# Patient Record
Sex: FEMALE | Race: WHITE | NOT HISPANIC OR LATINO | Employment: OTHER | URBAN - METROPOLITAN AREA
[De-identification: names, ages, dates, MRNs, and addresses within clinical notes are randomized per-mention and may not be internally consistent; named-entity substitution may affect disease eponyms.]

---

## 2023-06-15 ENCOUNTER — PATIENT OUTREACH (OUTPATIENT)
Dept: CARE COORDINATION | Facility: CLINIC | Age: 73
End: 2023-06-15
Payer: MEDICARE

## 2023-08-07 ENCOUNTER — OFFICE VISIT (OUTPATIENT)
Dept: PRIMARY CARE | Facility: CLINIC | Age: 73
End: 2023-08-07
Payer: MEDICARE

## 2023-08-07 VITALS
SYSTOLIC BLOOD PRESSURE: 136 MMHG | BODY MASS INDEX: 27.32 KG/M2 | WEIGHT: 164 LBS | TEMPERATURE: 98.6 F | OXYGEN SATURATION: 97 % | DIASTOLIC BLOOD PRESSURE: 84 MMHG | HEIGHT: 65 IN | HEART RATE: 74 BPM

## 2023-08-07 DIAGNOSIS — M12.9 ARTHRITIS, MULTIPLE JOINT INVOLVEMENT: ICD-10-CM

## 2023-08-07 DIAGNOSIS — R92.8 ABNORMAL MAMMOGRAM: ICD-10-CM

## 2023-08-07 DIAGNOSIS — Z78.0 ASYMPTOMATIC MENOPAUSAL STATE: ICD-10-CM

## 2023-08-07 DIAGNOSIS — H91.93 BILATERAL HEARING LOSS, UNSPECIFIED HEARING LOSS TYPE: ICD-10-CM

## 2023-08-07 DIAGNOSIS — Z85.038 HISTORY OF COLON CANCER: ICD-10-CM

## 2023-08-07 DIAGNOSIS — M85.80 OSTEOPENIA, SENILE: ICD-10-CM

## 2023-08-07 DIAGNOSIS — E04.2 NONTOXIC MULTINODULAR GOITER: ICD-10-CM

## 2023-08-07 DIAGNOSIS — H61.23 CERUMINOSIS, BILATERAL: ICD-10-CM

## 2023-08-07 DIAGNOSIS — E78.5 BORDERLINE HYPERLIPIDEMIA: ICD-10-CM

## 2023-08-07 DIAGNOSIS — L84 PRE-ULCERATIVE CORN OR CALLOUS: ICD-10-CM

## 2023-08-07 DIAGNOSIS — Z12.31 ENCOUNTER FOR SCREENING MAMMOGRAM FOR BREAST CANCER: ICD-10-CM

## 2023-08-07 DIAGNOSIS — E16.1 OTHER HYPOGLYCEMIA: ICD-10-CM

## 2023-08-07 DIAGNOSIS — Z13.6 SCREENING FOR CARDIOVASCULAR CONDITION: ICD-10-CM

## 2023-08-07 DIAGNOSIS — Z23 NEED FOR VACCINATION: ICD-10-CM

## 2023-08-07 DIAGNOSIS — Z00.00 ROUTINE GENERAL MEDICAL EXAMINATION AT HEALTH CARE FACILITY: Primary | ICD-10-CM

## 2023-08-07 DIAGNOSIS — Z13.21 ENCOUNTER FOR VITAMIN DEFICIENCY SCREENING: ICD-10-CM

## 2023-08-07 DIAGNOSIS — Z13.1 DIABETES MELLITUS SCREENING: ICD-10-CM

## 2023-08-07 DIAGNOSIS — E03.9 HYPOTHYROIDISM, UNSPECIFIED TYPE: ICD-10-CM

## 2023-08-07 DIAGNOSIS — E55.9 VITAMIN D DEFICIENCY: ICD-10-CM

## 2023-08-07 PROBLEM — K80.20 GALL STONES: Status: ACTIVE | Noted: 2023-08-07

## 2023-08-07 PROBLEM — M11.20 PSEUDOGOUT: Status: ACTIVE | Noted: 2023-08-07

## 2023-08-07 PROBLEM — Z98.890 STATUS POST EXCISIONAL BIOPSY: Status: ACTIVE | Noted: 2023-08-07

## 2023-08-07 PROBLEM — N64.89 RADIAL SCAR OF BREAST: Status: ACTIVE | Noted: 2023-08-07

## 2023-08-07 PROBLEM — M25.551 CHRONIC RIGHT HIP PAIN: Status: ACTIVE | Noted: 2023-08-07

## 2023-08-07 PROBLEM — M54.50 LUMBAGO: Status: ACTIVE | Noted: 2023-08-07

## 2023-08-07 PROBLEM — G89.29 CHRONIC RIGHT HIP PAIN: Status: ACTIVE | Noted: 2023-08-07

## 2023-08-07 PROBLEM — N95.2 VAGINAL ATROPHY: Status: ACTIVE | Noted: 2023-08-07

## 2023-08-07 PROBLEM — F43.20: Status: ACTIVE | Noted: 2023-08-07

## 2023-08-07 PROBLEM — N64.89 RADIAL SCAR OF BREAST: Status: RESOLVED | Noted: 2023-08-07 | Resolved: 2023-08-07

## 2023-08-07 PROCEDURE — G0439 PPPS, SUBSEQ VISIT: HCPCS | Performed by: INTERNAL MEDICINE

## 2023-08-07 PROCEDURE — 1125F AMNT PAIN NOTED PAIN PRSNT: CPT | Performed by: INTERNAL MEDICINE

## 2023-08-07 PROCEDURE — 1036F TOBACCO NON-USER: CPT | Performed by: INTERNAL MEDICINE

## 2023-08-07 PROCEDURE — 1159F MED LIST DOCD IN RCRD: CPT | Performed by: INTERNAL MEDICINE

## 2023-08-07 PROCEDURE — 1160F RVW MEDS BY RX/DR IN RCRD: CPT | Performed by: INTERNAL MEDICINE

## 2023-08-07 PROCEDURE — 99213 OFFICE O/P EST LOW 20 MIN: CPT | Performed by: INTERNAL MEDICINE

## 2023-08-07 PROCEDURE — 1170F FXNL STATUS ASSESSED: CPT | Performed by: INTERNAL MEDICINE

## 2023-08-07 PROCEDURE — 69210 REMOVE IMPACTED EAR WAX UNI: CPT | Performed by: INTERNAL MEDICINE

## 2023-08-07 RX ORDER — DICLOFENAC SODIUM 75 MG/1
75 TABLET, DELAYED RELEASE ORAL 2 TIMES DAILY PRN
Qty: 60 TABLET | Refills: 11 | Status: SHIPPED | OUTPATIENT
Start: 2023-08-07 | End: 2023-08-10 | Stop reason: SDUPTHER

## 2023-08-07 RX ORDER — DICLOFENAC SODIUM 75 MG/1
75 TABLET, DELAYED RELEASE ORAL 2 TIMES DAILY PRN
Qty: 60 TABLET | Refills: 5 | Status: SHIPPED
Start: 2023-08-07 | End: 2023-08-07 | Stop reason: SDUPTHER

## 2023-08-07 ASSESSMENT — LIFESTYLE VARIABLES
AUDIT-C TOTAL SCORE: 2
HOW OFTEN DO YOU HAVE A DRINK CONTAINING ALCOHOL: MONTHLY OR LESS
HOW OFTEN DO YOU HAVE SIX OR MORE DRINKS ON ONE OCCASION: LESS THAN MONTHLY
HOW MANY STANDARD DRINKS CONTAINING ALCOHOL DO YOU HAVE ON A TYPICAL DAY: 1 OR 2
SKIP TO QUESTIONS 9-10: 0

## 2023-08-07 ASSESSMENT — COLUMBIA-SUICIDE SEVERITY RATING SCALE - C-SSRS: 1. IN THE PAST MONTH, HAVE YOU WISHED YOU WERE DEAD OR WISHED YOU COULD GO TO SLEEP AND NOT WAKE UP?: NO

## 2023-08-07 ASSESSMENT — ACTIVITIES OF DAILY LIVING (ADL)
DOING_HOUSEWORK: INDEPENDENT
DRESSING: INDEPENDENT
BATHING: INDEPENDENT
MANAGING_FINANCES: INDEPENDENT
TAKING_MEDICATION: INDEPENDENT
GROCERY_SHOPPING: INDEPENDENT

## 2023-08-07 ASSESSMENT — PATIENT HEALTH QUESTIONNAIRE - PHQ9
2. FEELING DOWN, DEPRESSED OR HOPELESS: NOT AT ALL
1. LITTLE INTEREST OR PLEASURE IN DOING THINGS: NOT AT ALL
SUM OF ALL RESPONSES TO PHQ9 QUESTIONS 1 AND 2: 0
2. FEELING DOWN, DEPRESSED OR HOPELESS: NOT AT ALL
1. LITTLE INTEREST OR PLEASURE IN DOING THINGS: NOT AT ALL
SUM OF ALL RESPONSES TO PHQ9 QUESTIONS 1 AND 2: 0

## 2023-08-07 ASSESSMENT — ENCOUNTER SYMPTOMS: CONSTITUTIONAL NEGATIVE: 1

## 2023-08-07 NOTE — PROGRESS NOTES
"Subjective   Patient ID: Verito Cruz is a 73 y.o. female who presents for Medicare Annual Wellness Visit Initial.    HPI     Left  ear  blocked.   Reduced hearing.     Went on trip with walking and now  has a pain .         Review of Systems   Constitutional: Negative.    HENT:  Positive for hearing loss.    All other systems reviewed and are negative.      Objective   /84   Pulse 74   Temp 37 °C (98.6 °F)   Ht 1.651 m (5' 5\")   Wt 74.4 kg (164 lb)   SpO2 97%   BMI 27.29 kg/m²     Physical Exam  Vitals and nursing note reviewed.   Constitutional:       Appearance: Normal appearance.   HENT:      Head: Normocephalic and atraumatic.      Nose: Nose normal.   Eyes:      Conjunctiva/sclera: Conjunctivae normal.   Cardiovascular:      Rate and Rhythm: Normal rate and regular rhythm.   Pulmonary:      Effort: Pulmonary effort is normal.   Skin:     General: Skin is dry.   Neurological:      General: No focal deficit present.      Mental Status: She is alert and oriented to person, place, and time. Mental status is at baseline.   Psychiatric:         Mood and Affect: Mood normal.         Behavior: Behavior normal.         Assessment/Plan   Problem List Items Addressed This Visit          Medium    RESOLVED: Abnormal mammogram    Arthritis, multiple joint involvement    Relevant Medications    diclofenac (Voltaren) 75 mg EC tablet    Other Relevant Orders    CBC    Borderline hyperlipidemia    Relevant Orders    CBC    History of colon cancer    Nontoxic multinodular goiter    Relevant Orders    CBC    Osteopenia, senile    Relevant Orders    CBC    Vitamin D deficiency    Relevant Orders    Vitamin D 1,25 Dihydroxy     Other Visit Diagnoses       Routine general medical examination at health care facility    -  Primary    Relevant Orders    1 Year Follow Up In Primary Care - Wellness Exam    Other hypoglycemia        Relevant Orders    Hemoglobin A1C    Diabetes mellitus screening        Relevant Orders    " Hemoglobin A1C    Comprehensive Metabolic Panel    Screening for cardiovascular condition        Relevant Orders    Lipid panel    CT cardiac scoring wo IV contrast    Need for vaccination        Asymptomatic menopausal state        Relevant Orders    XR DEXA bone density    Encounter for screening mammogram for breast cancer        Relevant Orders    BI mammo bilateral screening tomosynthesis    Pre-ulcerative corn or callous        Relevant Orders    Referral to Podiatry    Encounter for vitamin deficiency screening        Relevant Orders    Vitamin D 1,25 Dihydroxy    Ceruminosis, bilateral   (Acute)      bilateroal  cerumenosis  cleaned . see prceedure not.   use  debrox  left  ear  x  5 nights.   return for  continued cleaning  7 d.    Relevant Medications    carbamide peroxide (Debrox) 6.5 % otic solution    Hypothyroidism, unspecified type        Relevant Orders    Thyroxine, Free    Bilateral hearing loss, unspecified hearing loss type [H91.93]            Patient ID: Verito Cruz is a 73 y.o. female.    Procedures    Cerumen Impaction: Patient presents with diminished hearing for the past 7 day.  There is not a prior history of cerumen impaction.  The patient was not using ear drops to loosen wax immediately prior to this visit.       Pt  jasmin  ear clearing of   cerumen impaction with several passes using  ear  curette  .  Large amounts  removed from  both ears using  several passes.   Left  ear incomplete. Had to stop  due to pain. No  bleeding,  tm puncture,  or infection .  Pt  tolerated  procedure well .      Fu  1 week for   additional  cleanign after using   h202   x  5 night.          .

## 2023-08-10 DIAGNOSIS — M12.9 ARTHRITIS, MULTIPLE JOINT INVOLVEMENT: ICD-10-CM

## 2023-08-10 RX ORDER — DICLOFENAC SODIUM 75 MG/1
75 TABLET, DELAYED RELEASE ORAL 2 TIMES DAILY PRN
Qty: 180 TABLET | Refills: 3 | Status: SHIPPED | OUTPATIENT
Start: 2023-08-10 | End: 2024-08-09

## 2023-08-14 ENCOUNTER — LAB (OUTPATIENT)
Dept: LAB | Facility: LAB | Age: 73
End: 2023-08-14
Payer: MEDICARE

## 2023-08-14 ENCOUNTER — OFFICE VISIT (OUTPATIENT)
Dept: PRIMARY CARE | Facility: CLINIC | Age: 73
End: 2023-08-14
Payer: MEDICARE

## 2023-08-14 VITALS
WEIGHT: 164 LBS | BODY MASS INDEX: 27.32 KG/M2 | HEART RATE: 70 BPM | OXYGEN SATURATION: 95 % | SYSTOLIC BLOOD PRESSURE: 131 MMHG | DIASTOLIC BLOOD PRESSURE: 77 MMHG | HEIGHT: 65 IN

## 2023-08-14 DIAGNOSIS — E16.1 OTHER HYPOGLYCEMIA: ICD-10-CM

## 2023-08-14 DIAGNOSIS — E55.9 VITAMIN D DEFICIENCY: ICD-10-CM

## 2023-08-14 DIAGNOSIS — E03.9 HYPOTHYROIDISM, UNSPECIFIED TYPE: ICD-10-CM

## 2023-08-14 DIAGNOSIS — E04.2 NONTOXIC MULTINODULAR GOITER: ICD-10-CM

## 2023-08-14 DIAGNOSIS — Z13.1 DIABETES MELLITUS SCREENING: ICD-10-CM

## 2023-08-14 DIAGNOSIS — M85.80 OSTEOPENIA, SENILE: ICD-10-CM

## 2023-08-14 DIAGNOSIS — M12.9 ARTHRITIS, MULTIPLE JOINT INVOLVEMENT: ICD-10-CM

## 2023-08-14 DIAGNOSIS — H61.22 LEFT EAR IMPACTED CERUMEN: Primary | ICD-10-CM

## 2023-08-14 DIAGNOSIS — Z13.21 ENCOUNTER FOR VITAMIN DEFICIENCY SCREENING: ICD-10-CM

## 2023-08-14 DIAGNOSIS — E78.5 BORDERLINE HYPERLIPIDEMIA: ICD-10-CM

## 2023-08-14 DIAGNOSIS — Z13.6 SCREENING FOR CARDIOVASCULAR CONDITION: ICD-10-CM

## 2023-08-14 LAB
ALANINE AMINOTRANSFERASE (SGPT) (U/L) IN SER/PLAS: 15 U/L (ref 7–45)
ALBUMIN (G/DL) IN SER/PLAS: 4 G/DL (ref 3.4–5)
ALKALINE PHOSPHATASE (U/L) IN SER/PLAS: 57 U/L (ref 33–136)
ANION GAP IN SER/PLAS: 12 MMOL/L (ref 10–20)
ASPARTATE AMINOTRANSFERASE (SGOT) (U/L) IN SER/PLAS: 16 U/L (ref 9–39)
BILIRUBIN TOTAL (MG/DL) IN SER/PLAS: 0.5 MG/DL (ref 0–1.2)
CALCIUM (MG/DL) IN SER/PLAS: 9.5 MG/DL (ref 8.6–10.6)
CARBON DIOXIDE, TOTAL (MMOL/L) IN SER/PLAS: 32 MMOL/L (ref 21–32)
CHLORIDE (MMOL/L) IN SER/PLAS: 105 MMOL/L (ref 98–107)
CHOLESTEROL (MG/DL) IN SER/PLAS: 196 MG/DL (ref 0–199)
CHOLESTEROL IN HDL (MG/DL) IN SER/PLAS: 88 MG/DL
CHOLESTEROL/HDL RATIO: 2.2
CREATININE (MG/DL) IN SER/PLAS: 0.61 MG/DL (ref 0.5–1.05)
ERYTHROCYTE DISTRIBUTION WIDTH (RATIO) BY AUTOMATED COUNT: 12.4 % (ref 11.5–14.5)
ERYTHROCYTE MEAN CORPUSCULAR HEMOGLOBIN CONCENTRATION (G/DL) BY AUTOMATED: 33.1 G/DL (ref 32–36)
ERYTHROCYTE MEAN CORPUSCULAR VOLUME (FL) BY AUTOMATED COUNT: 97 FL (ref 80–100)
ERYTHROCYTES (10*6/UL) IN BLOOD BY AUTOMATED COUNT: 4.44 X10E12/L (ref 4–5.2)
ESTIMATED AVERAGE GLUCOSE FOR HBA1C: 105 MG/DL
GFR FEMALE: >90 ML/MIN/1.73M2
GLUCOSE (MG/DL) IN SER/PLAS: 90 MG/DL (ref 74–99)
HEMATOCRIT (%) IN BLOOD BY AUTOMATED COUNT: 43.2 % (ref 36–46)
HEMOGLOBIN (G/DL) IN BLOOD: 14.3 G/DL (ref 12–16)
HEMOGLOBIN A1C/HEMOGLOBIN TOTAL IN BLOOD: 5.3 %
LDL: 94 MG/DL (ref 0–99)
LEUKOCYTES (10*3/UL) IN BLOOD BY AUTOMATED COUNT: 5.5 X10E9/L (ref 4.4–11.3)
NRBC (PER 100 WBCS) BY AUTOMATED COUNT: 0 /100 WBC (ref 0–0)
PLATELETS (10*3/UL) IN BLOOD AUTOMATED COUNT: 260 X10E9/L (ref 150–450)
POTASSIUM (MMOL/L) IN SER/PLAS: 4.6 MMOL/L (ref 3.5–5.3)
PROTEIN TOTAL: 6.4 G/DL (ref 6.4–8.2)
SODIUM (MMOL/L) IN SER/PLAS: 144 MMOL/L (ref 136–145)
THYROXINE (T4) FREE (NG/DL) IN SER/PLAS: 1.35 NG/DL (ref 0.78–1.48)
TRIGLYCERIDE (MG/DL) IN SER/PLAS: 70 MG/DL (ref 0–149)
UREA NITROGEN (MG/DL) IN SER/PLAS: 13 MG/DL (ref 6–23)
VLDL: 14 MG/DL (ref 0–40)

## 2023-08-14 PROCEDURE — 83036 HEMOGLOBIN GLYCOSYLATED A1C: CPT

## 2023-08-14 PROCEDURE — 84439 ASSAY OF FREE THYROXINE: CPT

## 2023-08-14 PROCEDURE — 85027 COMPLETE CBC AUTOMATED: CPT

## 2023-08-14 PROCEDURE — 80061 LIPID PANEL: CPT

## 2023-08-14 PROCEDURE — 1036F TOBACCO NON-USER: CPT | Performed by: INTERNAL MEDICINE

## 2023-08-14 PROCEDURE — 69210 REMOVE IMPACTED EAR WAX UNI: CPT | Performed by: INTERNAL MEDICINE

## 2023-08-14 PROCEDURE — 1160F RVW MEDS BY RX/DR IN RCRD: CPT | Performed by: INTERNAL MEDICINE

## 2023-08-14 PROCEDURE — 1159F MED LIST DOCD IN RCRD: CPT | Performed by: INTERNAL MEDICINE

## 2023-08-14 PROCEDURE — 1125F AMNT PAIN NOTED PAIN PRSNT: CPT | Performed by: INTERNAL MEDICINE

## 2023-08-14 PROCEDURE — 80053 COMPREHEN METABOLIC PANEL: CPT

## 2023-08-14 PROCEDURE — 82652 VIT D 1 25-DIHYDROXY: CPT

## 2023-08-14 PROCEDURE — 36415 COLL VENOUS BLD VENIPUNCTURE: CPT

## 2023-08-14 RX ORDER — DEXTROMETHORPHAN HYDROBROMIDE, GUAIFENESIN 5; 100 MG/5ML; MG/5ML
500 LIQUID ORAL AS NEEDED
COMMUNITY

## 2023-08-14 ASSESSMENT — COLUMBIA-SUICIDE SEVERITY RATING SCALE - C-SSRS
1. IN THE PAST MONTH, HAVE YOU WISHED YOU WERE DEAD OR WISHED YOU COULD GO TO SLEEP AND NOT WAKE UP?: NO
2. HAVE YOU ACTUALLY HAD ANY THOUGHTS OF KILLING YOURSELF?: NO
6. HAVE YOU EVER DONE ANYTHING, STARTED TO DO ANYTHING, OR PREPARED TO DO ANYTHING TO END YOUR LIFE?: NO

## 2023-08-14 ASSESSMENT — ENCOUNTER SYMPTOMS
OCCASIONAL FEELINGS OF UNSTEADINESS: 0
DEPRESSION: 0
LOSS OF SENSATION IN FEET: 0

## 2023-08-14 ASSESSMENT — PATIENT HEALTH QUESTIONNAIRE - PHQ9
1. LITTLE INTEREST OR PLEASURE IN DOING THINGS: NOT AT ALL
2. FEELING DOWN, DEPRESSED OR HOPELESS: NOT AT ALL
SUM OF ALL RESPONSES TO PHQ9 QUESTIONS 1 AND 2: 0

## 2023-08-14 NOTE — PROGRESS NOTES
"Patient ID: Verito Cruz is a 73 y.o. female.    Procedures    Ear cleaning left  due to hearing loss.   Sp debrox use.     Cerumen Impaction: Patient presents with diminished hearing for the past 7 day.  There is not a prior history of cerumen impaction.  The patient was  using h202 ear drops to loosen wax immediately prior to this visit.       Pt   left   ear clearing of   cerumen impaction with several passes using  ear  curette  .  Large amounts  removed from  left  ear  using  several passes.   Cleaned completely.   Well tolerated.   No  bleeding,  vertigo ,  tm puncture,  or infection .  Pt  tolerated  procedure well .      Subjective   Patient ID: Verito Cruz is a 73 y.o. female who presents for Follow-up (7 day ear  flush).        Objective   /77   Pulse 70   Ht 1.651 m (5' 5\")   Wt 74.4 kg (164 lb)   SpO2 95%   BMI 27.29 kg/m²       Assessment/Plan   Problem List Items Addressed This Visit    None  Visit Diagnoses       Left ear impacted cerumen    -  Primary               "

## 2023-08-16 LAB — VITAMIN D 1,25-DIHYDROXY: 44.6 PG/ML (ref 19.9–79.3)

## 2023-11-09 ENCOUNTER — ANCILLARY PROCEDURE (OUTPATIENT)
Dept: RADIOLOGY | Facility: HOSPITAL | Age: 73
End: 2023-11-09
Payer: MEDICARE

## 2023-11-09 VITALS — BODY MASS INDEX: 27.33 KG/M2 | WEIGHT: 164.02 LBS | HEIGHT: 65 IN

## 2023-11-09 DIAGNOSIS — Z12.31 ENCOUNTER FOR SCREENING MAMMOGRAM FOR MALIGNANT NEOPLASM OF BREAST: ICD-10-CM

## 2023-11-09 PROCEDURE — 77063 BREAST TOMOSYNTHESIS BI: CPT

## 2023-11-09 PROCEDURE — 77063 BREAST TOMOSYNTHESIS BI: CPT | Mod: BILATERAL PROCEDURE | Performed by: RADIOLOGY

## 2023-11-09 PROCEDURE — 77067 SCR MAMMO BI INCL CAD: CPT | Mod: BILATERAL PROCEDURE | Performed by: RADIOLOGY

## 2023-11-10 ENCOUNTER — ANCILLARY PROCEDURE (OUTPATIENT)
Dept: RADIOLOGY | Facility: CLINIC | Age: 73
End: 2023-11-10
Payer: MEDICARE

## 2023-11-10 DIAGNOSIS — Z13.6 ENCOUNTER FOR SCREENING FOR CARDIOVASCULAR DISORDERS: ICD-10-CM

## 2023-11-10 PROCEDURE — 75571 CT HRT W/O DYE W/CA TEST: CPT

## 2024-04-22 ENCOUNTER — OFFICE VISIT (OUTPATIENT)
Dept: OPHTHALMOLOGY | Facility: CLINIC | Age: 74
End: 2024-04-22
Payer: COMMERCIAL

## 2024-04-22 DIAGNOSIS — H25.813 COMBINED FORMS OF AGE-RELATED CATARACT OF BOTH EYES: ICD-10-CM

## 2024-04-22 DIAGNOSIS — H52.223 REGULAR ASTIGMATISM OF BOTH EYES: ICD-10-CM

## 2024-04-22 DIAGNOSIS — H52.03 HYPERMETROPIA OF BOTH EYES: Primary | ICD-10-CM

## 2024-04-22 DIAGNOSIS — H47.093 ASYMMETRY OF OPTIC NERVE OF BOTH EYES: ICD-10-CM

## 2024-04-22 DIAGNOSIS — H52.4 PRESBYOPIA: ICD-10-CM

## 2024-04-22 PROCEDURE — 92015 DETERMINE REFRACTIVE STATE: CPT | Performed by: OPTOMETRIST

## 2024-04-22 PROCEDURE — 92014 COMPRE OPH EXAM EST PT 1/>: CPT | Performed by: OPTOMETRIST

## 2024-04-22 PROCEDURE — 92133 CPTRZD OPH DX IMG PST SGM ON: CPT | Performed by: OPTOMETRIST

## 2024-04-22 ASSESSMENT — VISUAL ACUITY
METHOD: SNELLEN - LINEAR
OD_BAT_MED: 20/40
OD_PH_CC+: -2
OS_CC: 20/30
OD_CC+: -2
OD_CC: 20/30
OD_PH_CC: 20/25
CORRECTION_TYPE: GLASSES
OS_CC+: -1
OS_BAT_MED: 20/50

## 2024-04-22 ASSESSMENT — REFRACTION_MANIFEST
OS_AXIS: 166
OD_CYLINDER: +1.25
OD_SPHERE: +0.75
OD_SPHERE: +0.25
OS_CYLINDER: +1.25
OD_AXIS: 179
OS_SPHERE: PLANO
OS_SPHERE: -0.25
OD_ADD: +2.75
OD_AXIS: 170
OS_ADD: +2.75
OS_AXIS: 155
METHOD_AUTOREFRACTION: 1
OD_CYLINDER: +1.25
OS_CYLINDER: +1.00

## 2024-04-22 ASSESSMENT — CONF VISUAL FIELD
OD_SUPERIOR_NASAL_RESTRICTION: 0
OD_NORMAL: 1
OD_INFERIOR_NASAL_RESTRICTION: 0
OS_NORMAL: 1
OD_SUPERIOR_TEMPORAL_RESTRICTION: 0
OD_INFERIOR_TEMPORAL_RESTRICTION: 0
OS_INFERIOR_TEMPORAL_RESTRICTION: 0
OS_INFERIOR_NASAL_RESTRICTION: 0
OS_SUPERIOR_NASAL_RESTRICTION: 0
METHOD: COUNTING FINGERS
OS_SUPERIOR_TEMPORAL_RESTRICTION: 0

## 2024-04-22 ASSESSMENT — ENCOUNTER SYMPTOMS
ENDOCRINE NEGATIVE: 0
MUSCULOSKELETAL NEGATIVE: 0
GASTROINTESTINAL NEGATIVE: 0
EYES NEGATIVE: 0
PSYCHIATRIC NEGATIVE: 0
CONSTITUTIONAL NEGATIVE: 0
CARDIOVASCULAR NEGATIVE: 0
RESPIRATORY NEGATIVE: 0
ALLERGIC/IMMUNOLOGIC NEGATIVE: 0
NEUROLOGICAL NEGATIVE: 0
HEMATOLOGIC/LYMPHATIC NEGATIVE: 0

## 2024-04-22 ASSESSMENT — REFRACTION
OS_AXIS: 155
OD_CYLINDER: +1.00
OD_SPHERE: +0.75
OS_ADD: +2.75
OD_ADD: +2.75
OS_SPHERE: PLANO
OS_CYLINDER: +0.75
OD_AXIS: 170

## 2024-04-22 ASSESSMENT — CUP TO DISC RATIO
OD_RATIO: .35
OS_RATIO: .3

## 2024-04-22 ASSESSMENT — REFRACTION_WEARINGRX
OD_CYLINDER: SPHERE
OS_CYLINDER: SPHERE
OD_SPHERE: +0.75
OS_SPHERE: +0.25

## 2024-04-22 ASSESSMENT — TONOMETRY
OD_IOP_MMHG: 13
IOP_METHOD: GOLDMANN APPLANATION
OS_IOP_MMHG: 13

## 2024-04-22 ASSESSMENT — EXTERNAL EXAM - LEFT EYE: OS_EXAM: NORMAL

## 2024-04-22 ASSESSMENT — SLIT LAMP EXAM - LIDS
COMMENTS: NORMAL
COMMENTS: NORMAL

## 2024-04-22 ASSESSMENT — EXTERNAL EXAM - RIGHT EYE: OD_EXAM: NORMAL

## 2024-04-22 NOTE — PROGRESS NOTES
Assessment/Plan   Diagnoses and all orders for this visit:  Hypermetropia of both eyes  Regular astigmatism of both eyes  Presbyopia  New spec rx released today per patient request. Ocular health wnl for age OU. Monitor 1 year or sooner prn. Refraction billed today.    Combined forms of age-related cataract of both eyes  Patient's cataracts are visually significant at this time. Options for surgical referral discussed. Pt would like to wait on referral. Will monitor for changes.     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 Baseline OCT shows no glaucomatous thinning. Small nerves. Recommend monitor 1 year with DFE.

## 2024-05-30 ENCOUNTER — APPOINTMENT (OUTPATIENT)
Dept: PRIMARY CARE | Facility: CLINIC | Age: 74
End: 2024-05-30
Payer: MEDICARE

## 2024-06-20 ENCOUNTER — OFFICE VISIT (OUTPATIENT)
Dept: PRIMARY CARE | Facility: CLINIC | Age: 74
End: 2024-06-20
Payer: MEDICARE

## 2024-06-20 VITALS
SYSTOLIC BLOOD PRESSURE: 128 MMHG | OXYGEN SATURATION: 97 % | WEIGHT: 164.2 LBS | HEART RATE: 78 BPM | BODY MASS INDEX: 27.32 KG/M2 | DIASTOLIC BLOOD PRESSURE: 82 MMHG

## 2024-06-20 DIAGNOSIS — E04.2 NONTOXIC MULTINODULAR GOITER: ICD-10-CM

## 2024-06-20 DIAGNOSIS — Z85.038 HISTORY OF COLON CANCER: ICD-10-CM

## 2024-06-20 DIAGNOSIS — H61.21 EXCESSIVE CERUMEN IN RIGHT EAR CANAL: ICD-10-CM

## 2024-06-20 DIAGNOSIS — Z13.6 SCREENING FOR CARDIOVASCULAR CONDITION: ICD-10-CM

## 2024-06-20 DIAGNOSIS — Z12.31 SCREENING MAMMOGRAM FOR BREAST CANCER: ICD-10-CM

## 2024-06-20 DIAGNOSIS — H91.93 BILATERAL HEARING LOSS, UNSPECIFIED HEARING LOSS TYPE: Primary | ICD-10-CM

## 2024-06-20 DIAGNOSIS — E55.9 VITAMIN D DEFICIENCY: ICD-10-CM

## 2024-06-20 PROCEDURE — 1036F TOBACCO NON-USER: CPT | Performed by: INTERNAL MEDICINE

## 2024-06-20 PROCEDURE — 99213 OFFICE O/P EST LOW 20 MIN: CPT | Performed by: INTERNAL MEDICINE

## 2024-06-20 PROCEDURE — 1126F AMNT PAIN NOTED NONE PRSNT: CPT | Performed by: INTERNAL MEDICINE

## 2024-06-20 PROCEDURE — 99203 OFFICE O/P NEW LOW 30 MIN: CPT | Performed by: INTERNAL MEDICINE

## 2024-06-20 PROCEDURE — 1159F MED LIST DOCD IN RCRD: CPT | Performed by: INTERNAL MEDICINE

## 2024-06-20 PROCEDURE — 1157F ADVNC CARE PLAN IN RCRD: CPT | Performed by: INTERNAL MEDICINE

## 2024-06-20 PROCEDURE — 1124F ACP DISCUSS-NO DSCNMKR DOCD: CPT | Performed by: INTERNAL MEDICINE

## 2024-06-20 ASSESSMENT — ENCOUNTER SYMPTOMS
SHORTNESS OF BREATH: 0
COUGH: 0
ABDOMINAL PAIN: 0
BLOOD IN STOOL: 0
POLYDIPSIA: 0
PALPITATIONS: 0
NUMBNESS: 0
FATIGUE: 0
SINUS PRESSURE: 0
OCCASIONAL FEELINGS OF UNSTEADINESS: 0
ARTHRALGIAS: 1
DYSURIA: 0
FREQUENCY: 0
CHILLS: 0
UNEXPECTED WEIGHT CHANGE: 0
WHEEZING: 0
NAUSEA: 0
DEPRESSION: 0
TROUBLE SWALLOWING: 0
SEIZURES: 0
POLYPHAGIA: 0
TREMORS: 0
MYALGIAS: 0
VOMITING: 0
BACK PAIN: 0
LOSS OF SENSATION IN FEET: 0

## 2024-06-20 ASSESSMENT — PATIENT HEALTH QUESTIONNAIRE - PHQ9
SUM OF ALL RESPONSES TO PHQ9 QUESTIONS 1 AND 2: 0
2. FEELING DOWN, DEPRESSED OR HOPELESS: NOT AT ALL
1. LITTLE INTEREST OR PLEASURE IN DOING THINGS: NOT AT ALL

## 2024-06-20 ASSESSMENT — LIFESTYLE VARIABLES
AUDIT-C TOTAL SCORE: 1
HOW OFTEN DO YOU HAVE SIX OR MORE DRINKS ON ONE OCCASION: NEVER
SKIP TO QUESTIONS 9-10: 1
HOW MANY STANDARD DRINKS CONTAINING ALCOHOL DO YOU HAVE ON A TYPICAL DAY: 1 OR 2
HOW OFTEN DO YOU HAVE A DRINK CONTAINING ALCOHOL: MONTHLY OR LESS

## 2024-06-20 ASSESSMENT — PAIN SCALES - GENERAL: PAINLEVEL: 0-NO PAIN

## 2024-06-20 NOTE — PROGRESS NOTES
Subjective   Patient ID: Verito Cruz is a 73 y.o. female who presents for Establish Care.    HPI     Has history of colon cancer - diagnsoed in 2011, had colostomy and ileostomy was reversed in 2012  Complaining of ringing in both ears since one year. Has some eharing loss.  Takes diclofenac sparingly for osteoarthritis.  Enlarged thyroid nodule- had biopst in 2012??    Review of Systems   Constitutional:  Negative for chills, fatigue and unexpected weight change.   HENT:  Positive for hearing loss and tinnitus. Negative for postnasal drip, sinus pressure and trouble swallowing.    Respiratory:  Negative for cough, shortness of breath and wheezing.    Cardiovascular:  Negative for chest pain, palpitations and leg swelling.   Gastrointestinal:  Negative for abdominal pain, blood in stool, nausea and vomiting.   Endocrine: Negative for polydipsia, polyphagia and polyuria.   Genitourinary:  Negative for dysuria and frequency.   Musculoskeletal:  Positive for arthralgias. Negative for back pain and myalgias.   Skin:  Negative for rash.   Neurological:  Negative for tremors, seizures and numbness.   Psychiatric/Behavioral:  Negative for behavioral problems.        Objective   /82   Pulse 78   Wt 74.5 kg (164 lb 3.2 oz)   SpO2 97%   BMI 27.32 kg/m²     Physical Exam  Constitutional:       General: She is not in acute distress.     Appearance: Normal appearance.   HENT:      Head: Normocephalic and atraumatic.      Left Ear: Tympanic membrane normal.      Ears:      Comments: Excess cerumen right ear  Eyes:      Extraocular Movements: Extraocular movements intact.   Cardiovascular:      Rate and Rhythm: Normal rate and regular rhythm.      Heart sounds: Normal heart sounds. No murmur heard.     No friction rub.   Pulmonary:      Effort: Pulmonary effort is normal.      Breath sounds: Normal breath sounds. No wheezing or rales.   Abdominal:      General: Bowel sounds are normal.      Palpations: Abdomen is  soft.      Tenderness: There is no abdominal tenderness. There is no guarding.   Musculoskeletal:         General: Normal range of motion.      Right lower leg: No edema.      Left lower leg: No edema.   Neurological:      General: No focal deficit present.      Mental Status: She is alert and oriented to person, place, and time.      Cranial Nerves: No cranial nerve deficit.      Gait: Gait normal.   Psychiatric:         Mood and Affect: Mood normal.         Past Surgical History:   Procedure Laterality Date    BREAST SURGERY Right 2018    CARPAL TUNNEL RELEASE  01/08/2020    Neuroplasty Decompression Median Nerve At Carpal Tunnel    COLON SURGERY  04/06/2017    Colon Surgery    COLON SURGERY  01/08/2020    Colon Surgery    HYSTERECTOMY  01/08/2020    Hysterectomy    OTHER SURGICAL HISTORY  10/17/2013    Proctect Comb Abdominoper Pull-Thr Creation Colonic Reservr    OTHER SURGICAL HISTORY  01/30/2019    Breast biopsy core needle    OTHER SURGICAL HISTORY  01/08/2020    Closure Of Enterostomy With Resection And Anastomosis    OTHER SURGICAL HISTORY  01/08/2020    Partial Nephrectomy    TOTAL ABDOMINAL HYSTERECTOMY W/ BILATERAL SALPINGOOPHORECTOMY  01/08/2020    Salpingo-oophorectomy Bilateral     Past Medical History:   Diagnosis Date    Acute bronchitis due to other specified organisms 03/22/2016    Acute bronchitis due to other specified organisms    Acute upper respiratory infection, unspecified 02/09/2016    Viral URI    Allergic     Anxiety disorder, unspecified 01/08/2020    Anxiety    Arthritis 2020    Epigastric pain 11/05/2021    Acute epigastric pain    Excessive and frequent menstruation with regular cycle 10/10/2014    Spotting    HL (hearing loss) 2023    Malignant neoplasm of colon, unspecified (Multi) 01/08/2020    Colon cancer    Malignant neoplasm of rectum (Multi) 09/27/2021    Rectal cancer    Other conditions influencing health status 02/06/2014    History of burning on urination    Other  specified anxiety disorders 01/08/2020    Depression with anxiety    Pain in right shoulder 01/08/2020    Shoulder pain, right    Personal history of other (healed) physical injury and trauma 04/06/2017    History of strain    Personal history of other diseases of the digestive system 01/08/2020    History of chronic constipation    Personal history of other drug therapy 05/22/2019    History of pneumococcal vaccination    Personal history of other malignant neoplasm of kidney 01/08/2020    History of malignant neoplasm of kidney    Personal history of other specified conditions 09/27/2021    History of abdominal pain    Personal history of other specified conditions 10/11/2018    History of abnormal mammogram    Personal history of urinary (tract) infections 09/24/2018    History of urinary tract infection    Strain of muscle, fascia and tendon of abdomen, initial encounter 04/06/2017    Strain of muscle, fascia and tendon of abdomen, initial encounter    Strain of muscle, fascia and tendon of abdomen, initial encounter 11/05/2021    Abdominal muscle strain    Urinary tract infection, site not specified 10/30/2020    Acute lower UTI         Assessment/Plan        Verito was seen today for establish care.  Diagnoses and all orders for this visit:  History of colon cancer (Primary)  -     CBC and Auto Differential; Future  Bilateral hearing loss, unspecified hearing loss type  -     Comprehensive Metabolic Panel; Future  -     Referral to ENT; Future  Screening mammogram for breast cancer  -     BI mammo bilateral screening tomosynthesis; Future  Vitamin D deficiency  -     Vitamin D 25-Hydroxy,Total (for eval of Vitamin D levels); Future  Screening for cardiovascular condition  -     Lipid Panel; Future  Nontoxic multinodular goiter  -     TSH with reflex to Free T4 if abnormal; Future  Excessive cerumen in right ear canal      Current Outpatient Medications   Medication Instructions    acetaminophen (TYLENOL 8 HOUR)  500 mg, oral, As needed    diclofenac (VOLTAREN) 75 mg, oral, 2 times daily PRN, Do not crush, chew, or split.

## 2024-07-18 ENCOUNTER — LAB (OUTPATIENT)
Dept: LAB | Facility: LAB | Age: 74
End: 2024-07-18
Payer: MEDICARE

## 2024-07-18 DIAGNOSIS — H91.93 BILATERAL HEARING LOSS, UNSPECIFIED HEARING LOSS TYPE: ICD-10-CM

## 2024-07-18 DIAGNOSIS — Z13.6 SCREENING FOR CARDIOVASCULAR CONDITION: ICD-10-CM

## 2024-07-18 DIAGNOSIS — Z85.038 HISTORY OF COLON CANCER: ICD-10-CM

## 2024-07-18 DIAGNOSIS — E04.2 NONTOXIC MULTINODULAR GOITER: ICD-10-CM

## 2024-07-18 DIAGNOSIS — E55.9 VITAMIN D DEFICIENCY: ICD-10-CM

## 2024-07-18 LAB
25(OH)D3 SERPL-MCNC: 29 NG/ML (ref 31–100)
ALBUMIN SERPL-MCNC: 3.7 G/DL (ref 3.5–5)
ALP BLD-CCNC: 68 U/L (ref 35–125)
ALT SERPL-CCNC: 12 U/L (ref 5–40)
ANION GAP SERPL CALC-SCNC: 10 MMOL/L
AST SERPL-CCNC: 15 U/L (ref 5–40)
BASOPHILS # BLD AUTO: 0.06 X10*3/UL (ref 0–0.1)
BASOPHILS NFR BLD AUTO: 1.2 %
BILIRUB SERPL-MCNC: 0.4 MG/DL (ref 0.1–1.2)
BUN SERPL-MCNC: 12 MG/DL (ref 8–25)
CALCIUM SERPL-MCNC: 9 MG/DL (ref 8.5–10.4)
CHLORIDE SERPL-SCNC: 106 MMOL/L (ref 97–107)
CHOLEST SERPL-MCNC: 206 MG/DL (ref 133–200)
CHOLEST/HDLC SERPL: 2.3 {RATIO}
CO2 SERPL-SCNC: 26 MMOL/L (ref 24–31)
CREAT SERPL-MCNC: 0.7 MG/DL (ref 0.4–1.6)
EGFRCR SERPLBLD CKD-EPI 2021: 90 ML/MIN/1.73M*2
EOSINOPHIL # BLD AUTO: 0.16 X10*3/UL (ref 0–0.4)
EOSINOPHIL NFR BLD AUTO: 3.2 %
ERYTHROCYTE [DISTWIDTH] IN BLOOD BY AUTOMATED COUNT: 12.7 % (ref 11.5–14.5)
GLUCOSE SERPL-MCNC: 94 MG/DL (ref 65–99)
HCT VFR BLD AUTO: 42.2 % (ref 36–46)
HDLC SERPL-MCNC: 88 MG/DL
HGB BLD-MCNC: 13.9 G/DL (ref 12–16)
IMM GRANULOCYTES # BLD AUTO: 0.01 X10*3/UL (ref 0–0.5)
IMM GRANULOCYTES NFR BLD AUTO: 0.2 % (ref 0–0.9)
LDLC SERPL CALC-MCNC: 104 MG/DL (ref 65–130)
LYMPHOCYTES # BLD AUTO: 2.3 X10*3/UL (ref 0.8–3)
LYMPHOCYTES NFR BLD AUTO: 46 %
MCH RBC QN AUTO: 31.6 PG (ref 26–34)
MCHC RBC AUTO-ENTMCNC: 32.9 G/DL (ref 32–36)
MCV RBC AUTO: 96 FL (ref 80–100)
MONOCYTES # BLD AUTO: 0.53 X10*3/UL (ref 0.05–0.8)
MONOCYTES NFR BLD AUTO: 10.6 %
NEUTROPHILS # BLD AUTO: 1.94 X10*3/UL (ref 1.6–5.5)
NEUTROPHILS NFR BLD AUTO: 38.8 %
NRBC BLD-RTO: 0 /100 WBCS (ref 0–0)
PLATELET # BLD AUTO: 254 X10*3/UL (ref 150–450)
POTASSIUM SERPL-SCNC: 4.6 MMOL/L (ref 3.4–5.1)
PROT SERPL-MCNC: 5.9 G/DL (ref 5.9–7.9)
RBC # BLD AUTO: 4.4 X10*6/UL (ref 4–5.2)
SODIUM SERPL-SCNC: 142 MMOL/L (ref 133–145)
TRIGL SERPL-MCNC: 69 MG/DL (ref 40–150)
TSH SERPL DL<=0.05 MIU/L-ACNC: 1.85 MIU/L (ref 0.27–4.2)
WBC # BLD AUTO: 5 X10*3/UL (ref 4.4–11.3)

## 2024-07-18 PROCEDURE — 84443 ASSAY THYROID STIM HORMONE: CPT

## 2024-07-18 PROCEDURE — 80061 LIPID PANEL: CPT

## 2024-07-18 PROCEDURE — 36415 COLL VENOUS BLD VENIPUNCTURE: CPT

## 2024-07-18 PROCEDURE — 82306 VITAMIN D 25 HYDROXY: CPT

## 2024-07-18 PROCEDURE — 85025 COMPLETE CBC W/AUTO DIFF WBC: CPT

## 2024-07-18 PROCEDURE — 80053 COMPREHEN METABOLIC PANEL: CPT

## 2024-09-24 ENCOUNTER — OFFICE VISIT (OUTPATIENT)
Dept: PRIMARY CARE | Facility: CLINIC | Age: 74
End: 2024-09-24
Payer: MEDICARE

## 2024-09-24 VITALS
WEIGHT: 164 LBS | BODY MASS INDEX: 27.29 KG/M2 | TEMPERATURE: 98.1 F | DIASTOLIC BLOOD PRESSURE: 68 MMHG | SYSTOLIC BLOOD PRESSURE: 132 MMHG | HEART RATE: 82 BPM | OXYGEN SATURATION: 95 %

## 2024-09-24 DIAGNOSIS — M81.0 POST-MENOPAUSAL OSTEOPOROSIS: ICD-10-CM

## 2024-09-24 DIAGNOSIS — E55.9 VITAMIN D DEFICIENCY: ICD-10-CM

## 2024-09-24 DIAGNOSIS — R09.81 NASAL CONGESTION: ICD-10-CM

## 2024-09-24 DIAGNOSIS — E78.5 BORDERLINE HYPERLIPIDEMIA: ICD-10-CM

## 2024-09-24 DIAGNOSIS — Z00.00 MEDICARE ANNUAL WELLNESS VISIT, SUBSEQUENT: Primary | ICD-10-CM

## 2024-09-24 PROCEDURE — 1159F MED LIST DOCD IN RCRD: CPT | Performed by: INTERNAL MEDICINE

## 2024-09-24 PROCEDURE — 1036F TOBACCO NON-USER: CPT | Performed by: INTERNAL MEDICINE

## 2024-09-24 PROCEDURE — 99215 OFFICE O/P EST HI 40 MIN: CPT | Performed by: INTERNAL MEDICINE

## 2024-09-24 PROCEDURE — 1157F ADVNC CARE PLAN IN RCRD: CPT | Performed by: INTERNAL MEDICINE

## 2024-09-24 PROCEDURE — G0439 PPPS, SUBSEQ VISIT: HCPCS | Performed by: INTERNAL MEDICINE

## 2024-09-24 PROCEDURE — 99397 PER PM REEVAL EST PAT 65+ YR: CPT | Performed by: INTERNAL MEDICINE

## 2024-09-24 PROCEDURE — 1125F AMNT PAIN NOTED PAIN PRSNT: CPT | Performed by: INTERNAL MEDICINE

## 2024-09-24 RX ORDER — DICLOFENAC SODIUM 75 MG/1
75 TABLET, DELAYED RELEASE ORAL 2 TIMES DAILY PRN
COMMUNITY

## 2024-09-24 ASSESSMENT — ENCOUNTER SYMPTOMS
LOSS OF SENSATION IN FEET: 0
DYSURIA: 0
SINUS PRESSURE: 0
WHEEZING: 0
UNEXPECTED WEIGHT CHANGE: 0
NUMBNESS: 0
CHILLS: 0
TREMORS: 0
BACK PAIN: 0
OCCASIONAL FEELINGS OF UNSTEADINESS: 0
TROUBLE SWALLOWING: 0
POLYPHAGIA: 0
BLOOD IN STOOL: 0
FREQUENCY: 0
PALPITATIONS: 0
MYALGIAS: 0
VOMITING: 0
NAUSEA: 0
ABDOMINAL PAIN: 0
ARTHRALGIAS: 1
DEPRESSION: 0
POLYDIPSIA: 0
COUGH: 0
SEIZURES: 0
FATIGUE: 0
SHORTNESS OF BREATH: 0

## 2024-09-24 ASSESSMENT — PATIENT HEALTH QUESTIONNAIRE - PHQ9
1. LITTLE INTEREST OR PLEASURE IN DOING THINGS: NOT AT ALL
SUM OF ALL RESPONSES TO PHQ9 QUESTIONS 1 AND 2: 0
2. FEELING DOWN, DEPRESSED OR HOPELESS: NOT AT ALL

## 2024-09-24 ASSESSMENT — PAIN SCALES - GENERAL: PAINLEVEL: 7

## 2024-09-24 NOTE — PROGRESS NOTES
Subjective   Patient ID: Verito Cruz is a 74 y.o. female who presents for Annual Exam.    HPI     Has history of colon cancer - diagnosed in 2011, had colostomy and ileostomy was reversed in 2012  Complaining of ringing in both ears since one year. Has some hearing loss.  Takes diclofenac sparingly for osteoarthritis.  Enlarged thyroid nodule- had biopsy in 2012??  H/O Osteoporosis- refused treatment   Advised weight bearing exercises      Right foot pain - since today morning, woke her up with pain, chest pain near right first MTP joint.  Took Tylenol, which resolved the pain  Nasal congestion and mild cough X 3 weeks, no shortness of breath, fever, chills    Review of Systems   Constitutional:  Negative for chills, fatigue and unexpected weight change.   HENT:  Positive for tinnitus. Negative for hearing loss, postnasal drip, sinus pressure and trouble swallowing.    Respiratory:  Negative for cough, shortness of breath and wheezing.    Cardiovascular:  Negative for chest pain, palpitations and leg swelling.   Gastrointestinal:  Negative for abdominal pain, blood in stool, nausea and vomiting.   Endocrine: Negative for polydipsia, polyphagia and polyuria.   Genitourinary:  Negative for dysuria and frequency.   Musculoskeletal:  Positive for arthralgias. Negative for back pain and myalgias.   Skin:  Negative for rash.   Neurological:  Negative for tremors, seizures and numbness.   Psychiatric/Behavioral:  Negative for behavioral problems.        Objective   /68 (BP Location: Left arm, Patient Position: Sitting, BP Cuff Size: Adult)   Pulse 82   Temp 36.7 °C (98.1 °F) (Temporal)   Wt 74.4 kg (164 lb)   SpO2 95%   BMI 27.29 kg/m²     Physical Exam  Constitutional:       General: She is not in acute distress.     Appearance: Normal appearance.   HENT:      Head: Normocephalic and atraumatic.      Right Ear: Tympanic membrane normal.      Left Ear: Tympanic membrane normal.   Eyes:      Extraocular  Movements: Extraocular movements intact.      Conjunctiva/sclera: Conjunctivae normal.   Cardiovascular:      Rate and Rhythm: Normal rate and regular rhythm.      Heart sounds: Normal heart sounds. No murmur heard.     No friction rub.   Pulmonary:      Effort: Pulmonary effort is normal.      Breath sounds: Normal breath sounds. No wheezing or rales.   Abdominal:      General: Bowel sounds are normal.      Palpations: Abdomen is soft.      Tenderness: There is no abdominal tenderness. There is no guarding.   Musculoskeletal:         General: Normal range of motion.      Cervical back: Normal range of motion and neck supple.      Right lower leg: No edema.      Left lower leg: No edema.   Lymphadenopathy:      Cervical: No cervical adenopathy.   Skin:     Findings: No rash.   Neurological:      General: No focal deficit present.      Mental Status: She is alert and oriented to person, place, and time.      Cranial Nerves: No cranial nerve deficit.      Gait: Gait normal.   Psychiatric:         Mood and Affect: Mood normal.                 Assessment/Plan        Verito was seen today for annual exam.  Diagnoses and all orders for this visit:  Medicare annual wellness visit, subsequent (Primary)  Post-menopausal osteoporosis  Vitamin D deficiency  Borderline hyperlipidemia  Nasal congestion      Refused treatment for osteoporosis, stated all her family members have osteoporosis and have severe side effects  Recommended weightbearing exercises and daily calcium and vitamin D    Lab Results   Component Value Date    WBC 5.0 07/18/2024    HGB 13.9 07/18/2024    HCT 42.2 07/18/2024    MCV 96 07/18/2024     07/18/2024     Lab Results   Component Value Date    GLUCOSE 94 07/18/2024    CALCIUM 9.0 07/18/2024     07/18/2024    K 4.6 07/18/2024    CO2 26 07/18/2024     07/18/2024    BUN 12 07/18/2024    CREATININE 0.70 07/18/2024     Lab Results   Component Value Date    CHOL 206 (H) 07/18/2024    CHOL 196  "08/14/2023    CHOL 222 (H) 11/06/2020     Lab Results   Component Value Date    HDL 88.0 07/18/2024    HDL 88.0 08/14/2023    HDL 92.6 11/06/2020     Lab Results   Component Value Date    LDLCALC 104 07/18/2024     Lab Results   Component Value Date    TRIG 69 07/18/2024    TRIG 70 08/14/2023    TRIG 54 11/06/2020     No components found for: \"CHOLHDL\"  Narrative   Interpreted By:  SANCHEZ BUCKLEY MD  MRN: [id number]  Patient Name: PRASHANT REYNA     STUDY:  BONE DENSITY, DEXA 1 OR MORE SITES: AXIAL SKELETN8/28/2023 10:16 am     INDICATION:  See Associated Diagnosis. The patient is a 72 y/o  year old F.     COMPARISON:  None.     ACCESSION NUMBER(S):  [id number]     ORDERING CLINICIAN:  ERICK RAMSEY     TECHNIQUE:  BONE DENSITY, DEXA 1 OR MORE SITES: AXIAL SKELETN     FINDINGS:  LEFT FEMUR -TOTAL  Bone Mineral Density:0.666 g/cm2.  T-Score -2.7 Z-Score -1.2  % change vs Previous : 1.8. % change vs Baseline 1.8.     LEFT FEMUR -NECK  Bone Mineral Density:  0.620 g/cm2.  T-Score -3.0 Z-Score-1.2  % change vs Previous :-2.8 % change vs Baseline -2.8.     SPINE L1-L4  Bone Mineral Density:1.057 g/cm2.  T-Score -1.3 Z-Score 0.3  % change vs Previous : -5.3. % change vs Baseline -5.3.     World Health Organization (WHO) criteria for post-menopausal,   Women:  Normal:         T-score at or above -1 SD  Osteopenia:   T-score between -1 and -2.5 SD  Osteoporosis: T-score at or below -2.5 SD        10-year Fracture Risk:  Major Osteoporotic Fracture  Not reported  Hip Fracture                        Not reported         Impression   According to World Health Organization criteria, classification is  osteoporosis.     Follow-up exam is recommended as clinically warranted.     All images and detailed analysis are available on the  Radiology  PACS.     MACRO:  None       Current Outpatient Medications   Medication Instructions    acetaminophen (TYLENOL 8 HOUR) 500 mg, oral, As needed    diclofenac (VOLTAREN) 75 mg, oral, " 2 times daily PRN, Do not crush, chew, or split.

## 2024-10-25 ENCOUNTER — APPOINTMENT (OUTPATIENT)
Dept: AUDIOLOGY | Facility: CLINIC | Age: 74
End: 2024-10-25
Payer: MEDICARE

## 2024-10-25 ENCOUNTER — APPOINTMENT (OUTPATIENT)
Dept: OTOLARYNGOLOGY | Facility: CLINIC | Age: 74
End: 2024-10-25
Payer: MEDICARE

## 2024-10-25 VITALS — WEIGHT: 170.2 LBS | BODY MASS INDEX: 28.36 KG/M2 | HEIGHT: 65 IN

## 2024-10-25 DIAGNOSIS — H93.13 TINNITUS OF BOTH EARS: Primary | ICD-10-CM

## 2024-10-25 DIAGNOSIS — H91.93 BILATERAL HEARING LOSS, UNSPECIFIED HEARING LOSS TYPE: ICD-10-CM

## 2024-10-25 DIAGNOSIS — H90.3 SENSORINEURAL HEARING LOSS (SNHL) OF BOTH EARS: Primary | ICD-10-CM

## 2024-10-25 PROCEDURE — 92550 TYMPANOMETRY & REFLEX THRESH: CPT | Performed by: AUDIOLOGIST

## 2024-10-25 PROCEDURE — 1157F ADVNC CARE PLAN IN RCRD: CPT | Performed by: OTOLARYNGOLOGY

## 2024-10-25 PROCEDURE — 1160F RVW MEDS BY RX/DR IN RCRD: CPT | Performed by: OTOLARYNGOLOGY

## 2024-10-25 PROCEDURE — 99203 OFFICE O/P NEW LOW 30 MIN: CPT | Performed by: OTOLARYNGOLOGY

## 2024-10-25 PROCEDURE — 1036F TOBACCO NON-USER: CPT | Performed by: OTOLARYNGOLOGY

## 2024-10-25 PROCEDURE — 1159F MED LIST DOCD IN RCRD: CPT | Performed by: OTOLARYNGOLOGY

## 2024-10-25 PROCEDURE — 92557 COMPREHENSIVE HEARING TEST: CPT | Performed by: AUDIOLOGIST

## 2024-10-25 ASSESSMENT — PAIN SCALES - GENERAL: PAINLEVEL_OUTOF10: 0 - NO PAIN

## 2024-10-25 ASSESSMENT — PAIN - FUNCTIONAL ASSESSMENT: PAIN_FUNCTIONAL_ASSESSMENT: 0-10

## 2024-10-25 ASSESSMENT — ENCOUNTER SYMPTOMS: OCCASIONAL FEELINGS OF UNSTEADINESS: 0

## 2024-10-25 NOTE — PROGRESS NOTES
HPI  Verito Cruz is a 74 y.o. female kindly referred for evaluation of her hearing.  She has been getting more trouble from her children about asking for them to repeat.  She had an audiogram to have bilateral symmetric normal hearing through about 3000 Hz and then downsloping in the high frequencies.  Excellent word recognition scores normal tympanometry.  Denies otalgia and otorrhea and dizziness.  She does have some intermittent tinnitus.      Past Medical History:   Diagnosis Date    Acute bronchitis due to other specified organisms 03/22/2016    Acute bronchitis due to other specified organisms    Acute upper respiratory infection, unspecified 02/09/2016    Viral URI    Allergic     Anxiety disorder, unspecified 01/08/2020    Anxiety    Arthritis 2020    Epigastric pain 11/05/2021    Acute epigastric pain    Excessive and frequent menstruation with regular cycle 10/10/2014    Spotting    HL (hearing loss) 2023    Malignant neoplasm of colon, unspecified 01/08/2020    Colon cancer    Malignant neoplasm of rectum (Multi) 09/27/2021    Rectal cancer    Other conditions influencing health status 02/06/2014    History of burning on urination    Other specified anxiety disorders 01/08/2020    Depression with anxiety    Pain in right shoulder 01/08/2020    Shoulder pain, right    Personal history of other (healed) physical injury and trauma 04/06/2017    History of strain    Personal history of other diseases of the digestive system 01/08/2020    History of chronic constipation    Personal history of other drug therapy 05/22/2019    History of pneumococcal vaccination    Personal history of other malignant neoplasm of kidney 01/08/2020    History of malignant neoplasm of kidney    Personal history of other specified conditions 09/27/2021    History of abdominal pain    Personal history of other specified conditions 10/11/2018    History of abnormal mammogram    Personal history of urinary (tract) infections  "09/24/2018    History of urinary tract infection    Strain of muscle, fascia and tendon of abdomen, initial encounter 04/06/2017    Strain of muscle, fascia and tendon of abdomen, initial encounter    Strain of muscle, fascia and tendon of abdomen, initial encounter 11/05/2021    Abdominal muscle strain    Urinary tract infection, site not specified 10/30/2020    Acute lower UTI            Medications:     Current Outpatient Medications:     acetaminophen (Tylenol 8 Hour) 650 mg ER tablet, Take 500 mg by mouth if needed., Disp: , Rfl:     diclofenac (Voltaren) 75 mg EC tablet, Take 1 tablet (75 mg) by mouth 2 times a day as needed. Do not crush, chew, or split., Disp: , Rfl:      Allergies:  Allergies   Allergen Reactions    Sulfa (Sulfonamide Antibiotics) Rash, Swelling, Unknown and Anaphylaxis        Physical Exam:  Last Recorded Vitals  Height 1.651 m (5' 5\"), weight 77.2 kg (170 lb 3.2 oz).  General:     General appearance: Well-developed, well-nourished in no acute distress.       Voice:  normal       Head/face: Normal appearance; nontender to palpation     Facial nerve function: Normal and symmetric bilaterally.    Oral/oropharynx:     Oral vestibule: Normal labial and gingival mucosa     Tongue/floor of mouth: Normal without lesion     Oropharynx: Clear.  No lesions present of the hard/soft palate, posterior pharynx    Neck:     Neck: Normal appearance, trachea midline     Salivary glands: Normal to palpation bilaterally     Lymph nodes: No cervical lymphadenopathy to palpation     Thyroid: No thyromegaly.  No palpable nodules     Range of motion: Normal    Neurological:     Cortical functions: Alert and oriented x3, appropriate affect       Larynx/hypopharynx:     Laryngeal findings: Mirror exam inadequate or limited secondary to enlarged base of tongue and/or excessive gagging    Ear:     Ear canal: Normal bilaterally after cleaning cerumen impaction from the right with suction     Tympanic membrane: Intact " and mobile bilaterally     Pinna: Normal bilaterally     Hearing:  Gross hearing assessment normal by voice    Nose:     Visualized using: Anterior rhinoscopy     Nasopharynx: Inadequate mirror exam secondary to gag, anatomy.       Nasal dorsum: Nontraumatic midline appearance     Septum: Midline     Inferior turbinates: Normally sized     Mucosa: Bilateral, pink, normal appearing       ASSESSMENT/PLAN:  Right cerumen impaction cleaned.  Audiogram reviewed with the patient.  Tinnitus protocol reviewed.  Communication strategies reviewed.  Recheck as needed        Evens Hernandez MD

## 2024-10-25 NOTE — PROGRESS NOTES
AUDIOLOGY  AUDIOMETRIC EVALUATION    Name:  Verito Cruz  :  1950  Age:  74 y.o.  Date of Evaluation:  2024    Reason for visit: Ms. Cruz is seen in the clinic today at the request of Evens Hernandez MD in otolaryngology for an audiologic evaluation. Patient complains of difficulty hearing and constant tinnitus in the right ear. No prior audiologic evaluation is available for comparison.     SCREENINGS  Steadi Fall Risk  One or more falls in the last year? No  Feels unsteady when walking? No  Worried about Falling? No    Domestic Violence Screening  Are you currently or have you recently been threatened or abused physically, emotionally, or sexually by anyone? No  Do you feel UNSAFE going back to the place you are living? No    Pain Assessment  Pain Assessment: 0-10  0-10 (Numeric) Pain Score: 0 - No pain    EVALUATION  See scanned audiogram: “Media” > “Audiology Report” > Document ID [id number].      RESULTS  Otoscopic Evaluation  Right Ear: non-occluding cerumen without visualization of the tympanic membrane  Left Ear: minimal non-occluding cerumen with visualization of the tympanic membrane     Immittance Measures  Tympanometry:  Right Ear: Type C, normal tympanic membrane mobility with significantly negative middle ear pressure  Left Ear: Type A, normal tympanic membrane mobility with normal middle ear pressure    Acoustic Reflexes:  Ipsilateral Right Ear: absent from 500 Hz to 4000 Hz  Ipsilateral Left Ear: present and normal from 500 Hz to 4000 Hz  Contralateral Right Ear: did not evaluate  Contralateral Left Ear: did not evaluate    Distortion Product Otoacoustic Emissions (DPOAEs)  Right Ear: present from 1500 Hz to 3000 Hz, absent at 1000 Hz and from 4000 Hz to 8000 Hz  Left Ear: present from 1000 Hz to 3000 Hz, absent from 4000 Hz to 8000 Hz    Audiometry  Test Technique and Reliability:   Standard audiometry via supra-aural headphones. Pulsed tone stimulus. Reliability is  good.    Pure tone air and bone conduction audiometry:  Right Ear: normal hearing sensitivity through 3000 Hz with a mild to severe sensorineural hearing loss in the higher frequencies   Left Ear: normal hearing sensitivity through 3000 Hz with a mild to severe sensorineural hearing loss in the higher frequencies     Speech Audiometry:  Speech Reception Threshold (SRT) Right Ear: 15 dB HL  Speech Reception Threshold (SRT) Left Ear: 10 dB HL  Word Recognition Score (WRS) Right Ear: Excellent, 100% at 55 dB HL  Word Recognition Score (WRS) Left Ear: Excellent, 100% at 50 dB HL     IMPRESSIONS  Audiometric evaluation revealed high frequency sensorineural hearing loss bilaterally. Tympanometry indicates normal tympanic membrane mobility with significant negative middle ear pressure (Type C) in the right ear, and normal tympanic membrane mobility with normal middle ear pressure (Type A) in the left ear. No prior audiologic evaluation is available for comparison. The presence of acoustic reflexes within normal intensity limits is consistent with normal middle ear and brainstem function, and suggests that auditory sensitivity is not significantly impaired. Present Distortion Product Otoacoustic Emissions (DPOAEs) suggest normal/near normal cochlear outer hair cell function and are consistent with no greater than a mild hearing loss at those frequencies.    RECOMMENDATIONS  - Follow up with otolaryngology today as scheduled.  - Annual audiologic evaluation, sooner if an acute change is noted.  - Follow-up with medical care team as planned.    PATIENT EDUCATION  Discussed results, impressions and recommendations with the patient. Questions were addressed and the patient was encouraged to contact our office should concerns arise.    Time for this encounter: 2195-4773    Amadou Covington, CCC-A  Licensed Audiologist

## 2024-11-11 ENCOUNTER — HOSPITAL ENCOUNTER (OUTPATIENT)
Dept: RADIOLOGY | Facility: CLINIC | Age: 74
Discharge: HOME | End: 2024-11-11
Payer: MEDICARE

## 2024-11-11 VITALS — HEIGHT: 65 IN | WEIGHT: 155 LBS | BODY MASS INDEX: 25.83 KG/M2

## 2024-11-11 DIAGNOSIS — Z12.31 SCREENING MAMMOGRAM FOR BREAST CANCER: ICD-10-CM

## 2024-11-11 PROCEDURE — 77067 SCR MAMMO BI INCL CAD: CPT | Performed by: RADIOLOGY

## 2024-11-11 PROCEDURE — 77063 BREAST TOMOSYNTHESIS BI: CPT | Performed by: RADIOLOGY

## 2024-11-11 PROCEDURE — 77067 SCR MAMMO BI INCL CAD: CPT

## 2025-08-19 ENCOUNTER — OFFICE VISIT (OUTPATIENT)
Dept: PRIMARY CARE | Facility: CLINIC | Age: 75
End: 2025-08-19
Payer: MEDICARE

## 2025-08-19 VITALS
HEIGHT: 65 IN | WEIGHT: 155 LBS | HEART RATE: 70 BPM | TEMPERATURE: 97 F | DIASTOLIC BLOOD PRESSURE: 80 MMHG | BODY MASS INDEX: 25.83 KG/M2 | SYSTOLIC BLOOD PRESSURE: 128 MMHG | OXYGEN SATURATION: 96 %

## 2025-08-19 DIAGNOSIS — C18.9 MALIGNANT NEOPLASM OF COLON, UNSPECIFIED PART OF COLON (MULTI): ICD-10-CM

## 2025-08-19 DIAGNOSIS — E55.9 VITAMIN D DEFICIENCY: ICD-10-CM

## 2025-08-19 DIAGNOSIS — M81.0 POST-MENOPAUSAL OSTEOPOROSIS: ICD-10-CM

## 2025-08-19 DIAGNOSIS — E04.2 NONTOXIC MULTINODULAR GOITER: ICD-10-CM

## 2025-08-19 DIAGNOSIS — C20 MALIGNANT NEOPLASM OF RECTUM (MULTI): ICD-10-CM

## 2025-08-19 DIAGNOSIS — H81.10 BENIGN PAROXYSMAL POSITIONAL VERTIGO, UNSPECIFIED LATERALITY: Primary | ICD-10-CM

## 2025-08-19 DIAGNOSIS — E78.5 BORDERLINE HYPERLIPIDEMIA: ICD-10-CM

## 2025-08-19 PROCEDURE — 99214 OFFICE O/P EST MOD 30 MIN: CPT | Performed by: INTERNAL MEDICINE

## 2025-08-19 PROCEDURE — 1126F AMNT PAIN NOTED NONE PRSNT: CPT | Performed by: INTERNAL MEDICINE

## 2025-08-19 PROCEDURE — 1159F MED LIST DOCD IN RCRD: CPT | Performed by: INTERNAL MEDICINE

## 2025-08-19 PROCEDURE — G2211 COMPLEX E/M VISIT ADD ON: HCPCS | Performed by: INTERNAL MEDICINE

## 2025-08-19 PROCEDURE — 1157F ADVNC CARE PLAN IN RCRD: CPT | Performed by: INTERNAL MEDICINE

## 2025-08-19 RX ORDER — MECLIZINE HYDROCHLORIDE 25 MG/1
25 TABLET ORAL 3 TIMES DAILY PRN
Qty: 30 TABLET | Refills: 1 | Status: SHIPPED | OUTPATIENT
Start: 2025-08-19 | End: 2026-08-19

## 2025-08-19 RX ORDER — BISMUTH SUBSALICYLATE 262 MG
1 TABLET,CHEWABLE ORAL DAILY
COMMUNITY

## 2025-08-19 ASSESSMENT — ENCOUNTER SYMPTOMS
ARTHRALGIAS: 1
SHORTNESS OF BREATH: 0
SINUS PRESSURE: 0
VOMITING: 0
COUGH: 0
BLOOD IN STOOL: 0
OCCASIONAL FEELINGS OF UNSTEADINESS: 0
TREMORS: 0
UNEXPECTED WEIGHT CHANGE: 0
WHEEZING: 0
BACK PAIN: 0
NAUSEA: 0
TROUBLE SWALLOWING: 0
PALPITATIONS: 0
DYSURIA: 0
SEIZURES: 0
NUMBNESS: 0
CHILLS: 0
MYALGIAS: 0
ABDOMINAL PAIN: 0
DEPRESSION: 0
FATIGUE: 0
FREQUENCY: 0
LOSS OF SENSATION IN FEET: 0

## 2025-08-19 ASSESSMENT — PAIN SCALES - GENERAL: PAINLEVEL_OUTOF10: 0-NO PAIN

## 2025-08-21 PROBLEM — C20 MALIGNANT NEOPLASM OF RECTUM (MULTI): Status: ACTIVE | Noted: 2025-08-21

## 2025-08-21 PROBLEM — C18.9 MALIGNANT NEOPLASM OF COLON, UNSPECIFIED PART OF COLON (MULTI): Status: ACTIVE | Noted: 2025-08-21

## 2025-08-21 ASSESSMENT — ENCOUNTER SYMPTOMS: DIZZINESS: 1

## 2025-08-27 LAB
25(OH)D3+25(OH)D2 SERPL-MCNC: 34 NG/ML (ref 30–100)
ALBUMIN SERPL-MCNC: 3.9 G/DL (ref 3.6–5.1)
ALP SERPL-CCNC: 56 U/L (ref 37–153)
ALT SERPL-CCNC: 13 U/L (ref 6–29)
ANION GAP SERPL CALCULATED.4IONS-SCNC: 7 MMOL/L (CALC) (ref 7–17)
AST SERPL-CCNC: 14 U/L (ref 10–35)
BASOPHILS # BLD AUTO: 92 CELLS/UL (ref 0–200)
BASOPHILS NFR BLD AUTO: 1.8 %
BILIRUB SERPL-MCNC: 0.7 MG/DL (ref 0.2–1.2)
BUN SERPL-MCNC: 16 MG/DL (ref 7–25)
CALCIUM SERPL-MCNC: 9.2 MG/DL (ref 8.6–10.4)
CHLORIDE SERPL-SCNC: 106 MMOL/L (ref 98–110)
CHOLEST SERPL-MCNC: 194 MG/DL
CHOLEST/HDLC SERPL: 2.3 (CALC)
CO2 SERPL-SCNC: 29 MMOL/L (ref 20–32)
CREAT SERPL-MCNC: 0.59 MG/DL (ref 0.6–1)
EGFRCR SERPLBLD CKD-EPI 2021: 93 ML/MIN/1.73M2
EOSINOPHIL # BLD AUTO: 209 CELLS/UL (ref 15–500)
EOSINOPHIL NFR BLD AUTO: 4.1 %
ERYTHROCYTE [DISTWIDTH] IN BLOOD BY AUTOMATED COUNT: 12.3 % (ref 11–15)
GLUCOSE SERPL-MCNC: 94 MG/DL (ref 65–99)
HCT VFR BLD AUTO: 41.3 % (ref 35–45)
HDLC SERPL-MCNC: 84 MG/DL
HGB BLD-MCNC: 13.8 G/DL (ref 11.7–15.5)
LDLC SERPL CALC-MCNC: 95 MG/DL (CALC)
LYMPHOCYTES # BLD AUTO: 2295 CELLS/UL (ref 850–3900)
LYMPHOCYTES NFR BLD AUTO: 45 %
MCH RBC QN AUTO: 32 PG (ref 27–33)
MCHC RBC AUTO-ENTMCNC: 33.4 G/DL (ref 32–36)
MCV RBC AUTO: 95.8 FL (ref 80–100)
MONOCYTES # BLD AUTO: 439 CELLS/UL (ref 200–950)
MONOCYTES NFR BLD AUTO: 8.6 %
NEUTROPHILS # BLD AUTO: 2066 CELLS/UL (ref 1500–7800)
NEUTROPHILS NFR BLD AUTO: 40.5 %
NONHDLC SERPL-MCNC: 110 MG/DL (CALC)
PLATELET # BLD AUTO: 259 THOUSAND/UL (ref 140–400)
PMV BLD REES-ECKER: 10.7 FL (ref 7.5–12.5)
POTASSIUM SERPL-SCNC: 4.2 MMOL/L (ref 3.5–5.3)
PROT SERPL-MCNC: 5.9 G/DL (ref 6.1–8.1)
RBC # BLD AUTO: 4.31 MILLION/UL (ref 3.8–5.1)
SODIUM SERPL-SCNC: 142 MMOL/L (ref 135–146)
TRIGL SERPL-MCNC: 63 MG/DL
TSH SERPL-ACNC: 1.91 MIU/L (ref 0.4–4.5)
WBC # BLD AUTO: 5.1 THOUSAND/UL (ref 3.8–10.8)

## 2025-09-25 ENCOUNTER — APPOINTMENT (OUTPATIENT)
Dept: OTOLARYNGOLOGY | Facility: CLINIC | Age: 75
End: 2025-09-25
Payer: MEDICARE